# Patient Record
Sex: MALE | Race: WHITE | NOT HISPANIC OR LATINO
[De-identification: names, ages, dates, MRNs, and addresses within clinical notes are randomized per-mention and may not be internally consistent; named-entity substitution may affect disease eponyms.]

---

## 2019-07-01 PROBLEM — Z00.00 ENCOUNTER FOR PREVENTIVE HEALTH EXAMINATION: Status: ACTIVE | Noted: 2019-07-01

## 2019-07-08 ENCOUNTER — APPOINTMENT (OUTPATIENT)
Dept: ORTHOPEDIC SURGERY | Facility: CLINIC | Age: 54
End: 2019-07-08
Payer: COMMERCIAL

## 2019-07-08 VITALS
WEIGHT: 172 LBS | DIASTOLIC BLOOD PRESSURE: 83 MMHG | HEIGHT: 65 IN | HEART RATE: 59 BPM | SYSTOLIC BLOOD PRESSURE: 125 MMHG | BODY MASS INDEX: 28.66 KG/M2

## 2019-07-08 DIAGNOSIS — Z78.9 OTHER SPECIFIED HEALTH STATUS: ICD-10-CM

## 2019-07-08 DIAGNOSIS — Z80.0 FAMILY HISTORY OF MALIGNANT NEOPLASM OF DIGESTIVE ORGANS: ICD-10-CM

## 2019-07-08 DIAGNOSIS — Z87.19 PERSONAL HISTORY OF OTHER DISEASES OF THE DIGESTIVE SYSTEM: ICD-10-CM

## 2019-07-08 DIAGNOSIS — M25.562 PAIN IN LEFT KNEE: ICD-10-CM

## 2019-07-08 DIAGNOSIS — Z80.1 FAMILY HISTORY OF MALIGNANT NEOPLASM OF TRACHEA, BRONCHUS AND LUNG: ICD-10-CM

## 2019-07-08 PROCEDURE — 99204 OFFICE O/P NEW MOD 45 MIN: CPT

## 2019-07-08 NOTE — PHYSICAL EXAM
[de-identified] : Constitutional: Well-nourished, well-developed, No acute distress\par Respiratory:  Good respiratory effort, no SOB\par Lymphatic: No regional lymphadenopathy, no lymphedema\par Psychiatric: Pleasant and normal affect, alert and oriented x3\par Skin: Clean dry and intact B/L UE/LE\par Musculoskeletal: normal except where as noted in regional exam\par \par \par Right Knee:\par APPEARANCE: no marked deformities, no swelling or malalignment\par POSITIVE TENDERNESS:  none\par NONTENDER: jt lines b/l & retinacula b/l, patellar & quadriceps tendons, MCL/LCL, ITB at the lateral femoral condyle & Gerdy's tubercle, pes bursa. \par ROM: full & painless. \par RESISTIVE TESTING: painless resisted knee flex/ext. \par SPECIAL TESTS: stable v/v stress. painless grind. neg Lachman's. neg ant/post drawer. neg Maeve's. neg Thessaly test. neg Leia's & Malacrae's\par NEURO: Normal sensation of LE, DTRs 2+/4 patella and achilles\par PULSES: 2+ DP/PT pulses\par B/L Hips: No asymmetry, malalignment, or swelling, Full ROM, 5/5 strength in flexion/ext, IR/ER, Abd/Add, Joints stable\par B/L Ankles: No asymmetry, malalignment, or swelling, Full ROM, 5/5 strength in DF/PF/Inv/Ev, Joints stable\par BIOMECHANICAL EXAM: no marked leg length discrepancy, mild hip abductor weakness b/l, no marked foot pronation, tight hams and ITB b/l.  Normal gait and station\par \par Left Knee:\par APPEARANCE: mild swelling, no marked deformities or malalignment\par POSITIVE TENDERNESS:  tender medial joint line. \par NONTENDER: lateral jt line & retinacula b/l, patellar & quadriceps tendons, MCL/LCL, ITB at the lateral femoral condyle & Gerdy's tubercle, pes bursa. \par ROM: mild pain & restriction in flexion. \par RESISTIVE TESTING: painless resisted knee flex/ext. \par SPECIAL TESTS: Maeve's reproduces pain in the medial joint line. Thessaly test reproduces pain in the medial joint line. stable v/v stress. painless grind. neg Lachman's. neg ant/post drawer. neg Leia's & Malacrae's\par NEURO: Normal sensation of LE, DTRs 2+/4 patella and achilles\par PULSES: 2+ DP/PT pulses\par \par \par  [de-identified] : I reviewed and clinically correlated the following outside imaging studies,\par MRI left knee from the [facility]\par Impression: Peripheral radial tear of the body of the medial meniscus

## 2019-07-08 NOTE — DISCUSSION/SUMMARY
[de-identified] : Discussed findings of today's exam and possible causes of patient's pain.  Educated patient on their most probable diagnosis of left knee pain with localized medial joint line tenderness, degenerative medial meniscus tear on MRI, likely exacerbation of underlying degenerative tear and chondromalacia, doubt acute tear.  Reviewed possible courses of treatment, and we collaboratively decided best course of treatment at this time will include conservative management.  Patient will be started on a course of physical therapy to restore normal range of motion and strength as tolerated. Patient recommended to gradually return to physical activity starting with low impact activities such as bike, elliptical and swimming, and gradually resuming running thereafter. Patient has history of ulcerative colitis, would recommend deferral oral NSAIDs. May consider cortisone injection if he has persistent pain.  Follow up as needed.  Patient appreciates and agrees with current plan.\par \par This note was generated using dragon medical dictation software.  A reasonable effort has been made for proofreading its contents, but typos may still remain.  If there are any questions or points of clarification needed please notify my office.

## 2021-10-26 ENCOUNTER — TRANSCRIPTION ENCOUNTER (OUTPATIENT)
Age: 56
End: 2021-10-26

## 2021-11-05 ENCOUNTER — TRANSCRIPTION ENCOUNTER (OUTPATIENT)
Age: 56
End: 2021-11-05

## 2021-11-17 ENCOUNTER — TRANSCRIPTION ENCOUNTER (OUTPATIENT)
Age: 56
End: 2021-11-17

## 2021-11-23 ENCOUNTER — TRANSCRIPTION ENCOUNTER (OUTPATIENT)
Age: 56
End: 2021-11-23

## 2021-12-02 ENCOUNTER — TRANSCRIPTION ENCOUNTER (OUTPATIENT)
Age: 56
End: 2021-12-02

## 2021-12-15 ENCOUNTER — TRANSCRIPTION ENCOUNTER (OUTPATIENT)
Age: 56
End: 2021-12-15

## 2022-02-04 ENCOUNTER — TRANSCRIPTION ENCOUNTER (OUTPATIENT)
Age: 57
End: 2022-02-04

## 2022-02-18 ENCOUNTER — TRANSCRIPTION ENCOUNTER (OUTPATIENT)
Age: 57
End: 2022-02-18

## 2022-02-26 ENCOUNTER — TRANSCRIPTION ENCOUNTER (OUTPATIENT)
Age: 57
End: 2022-02-26

## 2022-03-19 ENCOUNTER — TRANSCRIPTION ENCOUNTER (OUTPATIENT)
Age: 57
End: 2022-03-19

## 2022-03-25 ENCOUNTER — TRANSCRIPTION ENCOUNTER (OUTPATIENT)
Age: 57
End: 2022-03-25

## 2022-04-09 ENCOUNTER — TRANSCRIPTION ENCOUNTER (OUTPATIENT)
Age: 57
End: 2022-04-09

## 2022-05-11 ENCOUNTER — NON-APPOINTMENT (OUTPATIENT)
Age: 57
End: 2022-05-11

## 2022-05-26 ENCOUNTER — NON-APPOINTMENT (OUTPATIENT)
Age: 57
End: 2022-05-26

## 2022-06-11 ENCOUNTER — NON-APPOINTMENT (OUTPATIENT)
Age: 57
End: 2022-06-11

## 2022-06-16 ENCOUNTER — NON-APPOINTMENT (OUTPATIENT)
Age: 57
End: 2022-06-16

## 2022-07-07 ENCOUNTER — NON-APPOINTMENT (OUTPATIENT)
Age: 57
End: 2022-07-07

## 2022-07-11 ENCOUNTER — APPOINTMENT (OUTPATIENT)
Dept: INFECTIOUS DISEASE | Facility: CLINIC | Age: 57
End: 2022-07-11

## 2022-07-11 DIAGNOSIS — Z71.84 ENC FOR HEALTH COUNSELING RELATED TO TRAVEL: ICD-10-CM

## 2022-07-11 PROCEDURE — 99401 PREV MED CNSL INDIV APPRX 15: CPT | Mod: 25

## 2022-07-11 PROCEDURE — 90472 IMMUNIZATION ADMIN EACH ADD: CPT | Mod: NC

## 2022-07-11 PROCEDURE — 90691 TYPHOID VACCINE IM: CPT

## 2022-07-11 PROCEDURE — 90715 TDAP VACCINE 7 YRS/> IM: CPT

## 2022-07-11 PROCEDURE — 90471 IMMUNIZATION ADMIN: CPT | Mod: NC

## 2022-07-11 RX ORDER — ATOVAQUONE AND PROGUANIL HYDROCHLORIDE 250; 100 MG/1; MG/1
250-100 TABLET, FILM COATED ORAL DAILY
Qty: 23 | Refills: 0 | Status: ACTIVE | COMMUNITY
Start: 2022-07-11 | End: 1900-01-01

## 2022-07-11 RX ORDER — AZITHROMYCIN 250 MG/1
250 TABLET, FILM COATED ORAL
Qty: 4 | Refills: 0 | Status: ACTIVE | COMMUNITY
Start: 2022-07-11 | End: 1900-01-01

## 2022-07-14 ENCOUNTER — NON-APPOINTMENT (OUTPATIENT)
Age: 57
End: 2022-07-14

## 2022-07-31 ENCOUNTER — NON-APPOINTMENT (OUTPATIENT)
Age: 57
End: 2022-07-31

## 2022-08-28 ENCOUNTER — NON-APPOINTMENT (OUTPATIENT)
Age: 57
End: 2022-08-28

## 2022-11-09 ENCOUNTER — NON-APPOINTMENT (OUTPATIENT)
Age: 57
End: 2022-11-09

## 2022-12-08 ENCOUNTER — NON-APPOINTMENT (OUTPATIENT)
Age: 57
End: 2022-12-08

## 2022-12-19 ENCOUNTER — NON-APPOINTMENT (OUTPATIENT)
Age: 57
End: 2022-12-19

## 2022-12-29 ENCOUNTER — APPOINTMENT (OUTPATIENT)
Dept: ORTHOPEDIC SURGERY | Facility: CLINIC | Age: 57
End: 2022-12-29
Payer: COMMERCIAL

## 2022-12-29 VITALS
HEART RATE: 76 BPM | DIASTOLIC BLOOD PRESSURE: 88 MMHG | SYSTOLIC BLOOD PRESSURE: 145 MMHG | TEMPERATURE: 97.9 F | BODY MASS INDEX: 29.32 KG/M2 | HEIGHT: 65 IN | WEIGHT: 176 LBS | OXYGEN SATURATION: 98 %

## 2022-12-29 DIAGNOSIS — M17.12 UNILATERAL PRIMARY OSTEOARTHRITIS, LEFT KNEE: ICD-10-CM

## 2022-12-29 PROCEDURE — 99204 OFFICE O/P NEW MOD 45 MIN: CPT

## 2022-12-30 NOTE — PHYSICAL EXAM
[de-identified] : Constitutional: Well-nourished, well-developed, No acute distress\par Respiratory:  Good respiratory effort, no SOB\par Lymphatic: No regional lymphadenopathy, no lymphedema\par Psychiatric: Pleasant and normal affect, alert and oriented x3\par Musculoskeletal: normal except where as noted in regional exam\par \par Left knee:\par APPEARANCE: no marked deformities, no swelling or malalignment\par POSITIVE TENDERNESS:  + crepitus of the anterior knee, and tenderness of patellar retinaculum\par NONTENDER: jt lines b/l, patellar & quadriceps tendons, MCL/LCL, ITB at the lateral femoral condyle & Gerdy's tubercle, pes bursa. \par ROM: full & painless, although some discomfort in deep knee flexion\par RESISTIVE TESTING: + discomfort with knee ext from deep knee flexion (stretched position), painless knee flexion. \par SPECIAL TESTS: stable v/v stress. painless grind. neg Lachman's. neg ant/post drawer. neg Maeve's. \par

## 2022-12-30 NOTE — DISCUSSION/SUMMARY
[de-identified] : Discussed findings of today's exam and possible causes of patient's pain.  Educated patient on their most probable diagnosis of atraumatic onset of chronic intermittent left knee pain, patient seems to have some exacerbation of mild osteoarthritis.  While patient may have degenerative meniscus tear as etiology of his pain he is already having improvement with his recent course of physical therapy, he has no recent injury or evidence of acute internal derangement, no surgical indications, recommend deferral of MRI.  Reviewed possible courses of treatment, and we collaboratively decided best course of treatment at this time will include continued course of physical therapy, a prescription was provided so patient can continue at this time.  Patient started on a course of oral NSAIDs, prescription given for Naprosyn (We discussed all possible side effects of this medication).  If he has persisting pain may consider cortisone injection at that time.  Follow up as needed.  Patient appreciates and agrees with current plan.\par \par \par This note was generated using dragon medical dictation software.  A reasonable effort has been made for proofreading its contents, but typos may still remain.  If there are any questions or points of clarification needed please notify my office.\par

## 2022-12-30 NOTE — HISTORY OF PRESENT ILLNESS
[de-identified] : Patient is here for left knee pain that began about 5 weeks ago. There was no inciting injury but he noticed it when running. He went to PT through direct access for 10 sessions. He has found it to be helpful. He has not had full resolution yet. He would like to continue with PT. He has tried ice, elevation and rest to treat it. \par \par The patient's past medical history, past surgical history, medications and allergies were reviewed by me today and documented accordingly. In addition, the patient's family and social history, which were noncontributory to this visit, were reviewed also. Intake form was reviewed. The patient has no family history of arthritis.

## 2023-03-02 ENCOUNTER — NON-APPOINTMENT (OUTPATIENT)
Age: 58
End: 2023-03-02

## 2023-05-08 ENCOUNTER — NON-APPOINTMENT (OUTPATIENT)
Age: 58
End: 2023-05-08

## 2023-08-06 ENCOUNTER — NON-APPOINTMENT (OUTPATIENT)
Age: 58
End: 2023-08-06

## 2023-08-08 ENCOUNTER — APPOINTMENT (OUTPATIENT)
Dept: NEUROLOGY | Facility: CLINIC | Age: 58
End: 2023-08-08
Payer: COMMERCIAL

## 2023-08-08 ENCOUNTER — LABORATORY RESULT (OUTPATIENT)
Age: 58
End: 2023-08-08

## 2023-08-08 VITALS
SYSTOLIC BLOOD PRESSURE: 163 MMHG | BODY MASS INDEX: 29.66 KG/M2 | HEART RATE: 50 BPM | HEIGHT: 65 IN | WEIGHT: 178 LBS | DIASTOLIC BLOOD PRESSURE: 88 MMHG

## 2023-08-08 DIAGNOSIS — G25.0 ESSENTIAL TREMOR: ICD-10-CM

## 2023-08-08 DIAGNOSIS — D47.2 MONOCLONAL GAMMOPATHY: ICD-10-CM

## 2023-08-08 PROCEDURE — 99204 OFFICE O/P NEW MOD 45 MIN: CPT

## 2023-08-08 RX ORDER — PROPRANOLOL HYDROCHLORIDE 10 MG/1
10 TABLET ORAL
Qty: 180 | Refills: 0 | Status: ACTIVE | COMMUNITY
Start: 2023-08-08 | End: 1900-01-01

## 2023-08-08 RX ORDER — PROPRANOLOL HYDROCHLORIDE 10 MG/1
10 TABLET ORAL
Qty: 540 | Refills: 0 | Status: ACTIVE | COMMUNITY
Start: 2023-08-08 | End: 1900-01-01

## 2023-08-08 NOTE — PHYSICAL EXAM
[FreeTextEntry1] : Constitutional:  Patient was well-developed, well-nourished and in no acute distress.   Head:  Normocephalic, atraumatic. Tympanic membranes were clear.   Neck:  Supple with full range of motion.   Cardiovascular:  Cardiac rhythm was regular without murmur. There were no carotid bruits. Peripheral pulses were full and symmetric.   Respiratory:  Lungs were clear.   Abdomen:  Soft and nontender.   Spine:  Nontender.   Skin:  There were no rashes.   NEUROLOGICAL EXAMINATION:  Mental Status: Patient was alert and oriented. Speech was fluent. There was no dysarthria.  There was no vocal tremor.  Cranial Nerves:   II: Visual acuity was 20/20 bilaterally with near card. Pupils were equal and reactive. Visual fields were full. Funduscopic examination was normal.   III, IV, VI:  Eye movements were full without nystagmus.   V: Facial sensation was intact.   VII: Facial strength was normal.  There was no facial masking.  On occasion, I noted a mild lower lip tremor.  VIII: Hearing was equal.   IX, X: Palatal movement was normal. Phonation was normal.   XI: Sternocleidomastoids and trapezii were normal.   XII: Tongue was midline and movements normal. There was no lingual atrophy or fasciculations.   Motor Examination: Muscle bulk, tone and strength were normal.  There was a fine left greater than right postural tremor.  There was no action or rest tremor.  Rapid alternating and fine finger movements were normal.  Spiral drawing was consistent with an essential tremor.  Sensory Examination: Pinprick, vibration and joint position sense were intact.   Reflexes: DTRs were 2+ throughout.   Plantar Responses: Plantar responses were flexor.   Coordination/Cerebellar Function: There was no dysmetria on finger to nose or heel to shin testing.   Gait/Stance: Gait and tandem were normal. Romberg was negative.

## 2023-08-08 NOTE — ASSESSMENT
[FreeTextEntry1] : Mr. Moncada is a 57-year-old with hypertension, hyperlipidemia, ulcerative colitis and smoldering myeloma.  He has a longstanding action tremor without family history.  This likely represents a benign essential tremor.  As expected, his tremor has worsened with age.  I suggested that he undergo a serologic evaluation.  He does not wish to pursue an MRI of the brain.  If there is no medical or cardiac contraindication, I suggested a trial of low-dose propranolol possibly 10 or 20 mg up to 3 times daily or as needed.  Further management will depend upon the above results and his clinical course.  I suggested close telephone and office follow-up.

## 2023-08-08 NOTE — HISTORY OF PRESENT ILLNESS
[FreeTextEntry1] : Mr. Thomas Moncada is a 57-year-old right-handed gentleman who was referred for neurologic evaluation at your kind suggestion.  He is accompanied by his wife Yee.  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  In 2006, he first noticed the onset of an action tremor in his left more than right hand.  Over time, this tremor has fluctuated in severity.  It is worsened when he is under stress.  It occurs with action and not at rest.  He denies rigidity or gait difficulties.  There has been no change in his handwriting.  He does not use caffeine or alcohol.  He has been increasingly jittery over the past 6 weeks.  He was evaluated in the past by a neurologist who prescribed a mild sedative diazepam which "worked great."  There is no family history of tremor to  He suffers from smoldering myeloma diagnosed in 2012.  He is under the care of Dr. Marino Saeed.  He also suffers from longstanding ulcerative colitis for which he is being treated with mercaptopurine and hydrocortisone and mesalamine enemas.  He is under the care of Dr. Darci High.  He suffers from rare headaches which she calls migraines.  These are easily treated with over-the-counter analgesics.  He denies visual, hearing, swallowing, other motor, sensory, gait or sphincteric difficulties.  Past surgical history is unremarkable.  He suffers from hypertension, hyperlipidemia, common migraine, ulcerative colitis and smoldering myeloma.  There is no history of diabetes, cardiac, pulmonary, renal, hepatic, thyroid or cerebrovascular disease.  He has no drug allergies.  Medications include mercaptopurine and hydrocortisone and mesalamine enemas.  He is a non-smoker and nondrinker.  He is  and works as a percussionist.  Family history is notable for a father with pancreatic cancer and a sister with Crohn's disease and tardive dyskinesia, a brother with hypertension and a mother with lung cancer, TIAs and back issues.

## 2023-08-08 NOTE — CONSULT LETTER
[Dear  ___] : Dear  [unfilled], [Consult Letter:] : I had the pleasure of evaluating your patient, [unfilled]. [Please see my note below.] : Please see my note below. [Consult Closing:] : Thank you very much for allowing me to participate in the care of this patient.  If you have any questions, please do not hesitate to contact me. [Sincerely,] : Sincerely, [FreeTextEntry3] : Jose Francisco Grimes MD

## 2023-08-09 ENCOUNTER — NON-APPOINTMENT (OUTPATIENT)
Age: 58
End: 2023-08-09

## 2023-08-09 LAB
ALBUMIN MFR SERPL ELPH: 63.1 %
ALBUMIN SERPL ELPH-MCNC: 4.3 G/DL
ALBUMIN SERPL-MCNC: 4.2 G/DL
ALBUMIN/GLOB SERPL: 1.7 RATIO
ALP BLD-CCNC: 84 U/L
ALPHA1 GLOB MFR SERPL ELPH: 3.9 %
ALPHA1 GLOB SERPL ELPH-MCNC: 0.3 G/DL
ALPHA2 GLOB MFR SERPL ELPH: 6.8 %
ALPHA2 GLOB SERPL ELPH-MCNC: 0.5 G/DL
ALT SERPL-CCNC: 19 U/L
ANION GAP SERPL CALC-SCNC: 12 MMOL/L
AST SERPL-CCNC: 21 U/L
B-GLOBULIN MFR SERPL ELPH: 8.6 %
B-GLOBULIN SERPL ELPH-MCNC: 0.6 G/DL
BILIRUB SERPL-MCNC: 1.4 MG/DL
BUN SERPL-MCNC: 11 MG/DL
CALCIUM SERPL-MCNC: 9.7 MG/DL
CERULOPLASMIN SERPL-MCNC: 19 MG/DL
CHLORIDE SERPL-SCNC: 101 MMOL/L
CO2 SERPL-SCNC: 25 MMOL/L
CREAT SERPL-MCNC: 0.96 MG/DL
DEPRECATED KAPPA LC FREE/LAMBDA SER: 29.95 RATIO
EGFR: 92 ML/MIN/1.73M2
GAMMA GLOB FLD ELPH-MCNC: 1.2 G/DL
GAMMA GLOB MFR SERPL ELPH: 17.6 %
GLUCOSE SERPL-MCNC: 114 MG/DL
IGA SER QL IEP: 72 MG/DL
IGG SER QL IEP: 1288 MG/DL
IGM SER QL IEP: 80 MG/DL
INTERPRETATION SERPL IEP-IMP: NORMAL
KAPPA LC CSF-MCNC: 1.21 MG/DL
KAPPA LC SERPL-MCNC: 36.24 MG/DL
M PROTEIN MFR SERPL ELPH: 11 %
M PROTEIN SPEC IFE-MCNC: NORMAL
MONOCLON BAND OBS SERPL: 0.7 G/DL
POTASSIUM SERPL-SCNC: 4.4 MMOL/L
PROT SERPL-MCNC: 6.5 G/DL
PROT SERPL-MCNC: 6.7 G/DL
PROT SERPL-MCNC: 6.7 G/DL
SODIUM SERPL-SCNC: 138 MMOL/L
TSH SERPL-ACNC: 1.21 UIU/ML

## 2023-08-11 LAB
ASIALO-GM1 ANTIBODIES, IGG/IGM: 22 IV
GD1A ANTIBODIES, IGG/IGM: NORMAL IV
GD1B ANTIBODIES, IGG/IGM: 8 IV
GM1 ANTIBODIES, IGG/IGM: 11 IV
GM2 ANTIBODIES, IGG/IGM: 9 IV
GQ1B ANTIBODIES, IGG/IGM: 7 IV
IGA 24H UR QL IFE: NORMAL

## 2023-08-12 LAB — FMR1 GENE MUT ANL BLD/T: NORMAL

## 2023-08-14 LAB — COPPER SERPL-MCNC: 87 UG/DL

## 2023-08-19 LAB — MAG AB SER QL: NEGATIVE

## 2023-08-21 ENCOUNTER — NON-APPOINTMENT (OUTPATIENT)
Age: 58
End: 2023-08-21

## 2023-09-06 LAB — SENSORIMOTOR NEUROPATHY PROFILE W/ RECOMBX: NORMAL

## 2023-11-03 ENCOUNTER — APPOINTMENT (OUTPATIENT)
Dept: NEUROLOGY | Facility: CLINIC | Age: 58
End: 2023-11-03

## 2023-11-17 ENCOUNTER — APPOINTMENT (OUTPATIENT)
Dept: NEUROLOGY | Facility: CLINIC | Age: 58
End: 2023-11-17

## 2023-12-17 ENCOUNTER — NON-APPOINTMENT (OUTPATIENT)
Age: 58
End: 2023-12-17

## 2024-02-02 ENCOUNTER — APPOINTMENT (OUTPATIENT)
Dept: NEUROLOGY | Facility: CLINIC | Age: 59
End: 2024-02-02